# Patient Record
Sex: MALE | Race: WHITE | URBAN - METROPOLITAN AREA
[De-identification: names, ages, dates, MRNs, and addresses within clinical notes are randomized per-mention and may not be internally consistent; named-entity substitution may affect disease eponyms.]

---

## 2023-03-21 ENCOUNTER — OFFICE (OUTPATIENT)
Dept: URBAN - METROPOLITAN AREA CLINIC 94 | Facility: CLINIC | Age: 78
Setting detail: OPHTHALMOLOGY
End: 2023-03-21
Payer: MEDICARE

## 2023-03-21 DIAGNOSIS — D31.32: ICD-10-CM

## 2023-03-21 DIAGNOSIS — H43.811: ICD-10-CM

## 2023-03-21 DIAGNOSIS — H35.413: ICD-10-CM

## 2023-03-21 DIAGNOSIS — H35.3121: ICD-10-CM

## 2023-03-21 PROCEDURE — 92235 FLUORESCEIN ANGRPH MLTIFRAME: CPT | Performed by: OPHTHALMOLOGY

## 2023-03-21 PROCEDURE — 92134 CPTRZ OPH DX IMG PST SGM RTA: CPT | Performed by: OPHTHALMOLOGY

## 2023-03-21 PROCEDURE — 92014 COMPRE OPH EXAM EST PT 1/>: CPT | Performed by: OPHTHALMOLOGY

## 2023-03-21 ASSESSMENT — KERATOMETRY
OS_K1POWER_DIOPTERS: 43.25
OD_AXISANGLE_DEGREES: 11
OS_K2POWER_DIOPTERS: 43.50
OD_K1POWER_DIOPTERS: 42.75
OD_K2POWER_DIOPTERS: 44.00
OS_AXISANGLE_DEGREES: 175

## 2023-03-21 ASSESSMENT — PACHYMETRY
OD_CT_UM: 512
OD_CT_CORRECTION: 2
OS_CT_UM: 514
OS_CT_CORRECTION: 2

## 2023-03-21 ASSESSMENT — REFRACTION_AUTOREFRACTION
OS_AXIS: 90
OD_SPHERE: -0.50
OS_SPHERE: -1.50
OS_CYLINDER: -1.25
OD_AXIS: 95
OD_CYLINDER: -2.00

## 2023-03-21 ASSESSMENT — AXIALLENGTH_DERIVED
OD_AL: 24.2395
OS_AL: 24.4993

## 2023-03-21 ASSESSMENT — CONFRONTATIONAL VISUAL FIELD TEST (CVF)
OS_FINDINGS: FULL
OD_FINDINGS: FULL

## 2023-03-21 ASSESSMENT — SPHEQUIV_DERIVED
OD_SPHEQUIV: -1.5
OS_SPHEQUIV: -2.125

## 2023-03-21 ASSESSMENT — VISUAL ACUITY
OD_BCVA: 20/25-1
OS_BCVA: 20/25-1

## 2024-02-28 PROBLEM — Z00.00 ENCOUNTER FOR PREVENTIVE HEALTH EXAMINATION: Status: ACTIVE | Noted: 2024-02-28

## 2024-03-01 ENCOUNTER — APPOINTMENT (OUTPATIENT)
Dept: UROLOGY | Facility: CLINIC | Age: 79
End: 2024-03-01
Payer: MEDICARE

## 2024-03-01 VITALS
SYSTOLIC BLOOD PRESSURE: 186 MMHG | HEART RATE: 66 BPM | HEIGHT: 68 IN | DIASTOLIC BLOOD PRESSURE: 92 MMHG | WEIGHT: 155 LBS | BODY MASS INDEX: 23.49 KG/M2

## 2024-03-01 DIAGNOSIS — N20.1 CALCULUS OF URETER: ICD-10-CM

## 2024-03-01 DIAGNOSIS — Z78.9 OTHER SPECIFIED HEALTH STATUS: ICD-10-CM

## 2024-03-01 PROCEDURE — 99203 OFFICE O/P NEW LOW 30 MIN: CPT

## 2024-03-01 RX ORDER — UBIDECARENONE/VIT E ACET 100MG-5
50 MCG CAPSULE ORAL
Refills: 0 | Status: ACTIVE | COMMUNITY

## 2024-03-01 RX ORDER — TAMSULOSIN HCL 0.4 MG
0.4 CAPSULE ORAL
Refills: 0 | Status: ACTIVE | COMMUNITY

## 2024-03-01 RX ORDER — TAMARIND SEED/TURMERIC EXTRACT 250 MG
TABLET ORAL
Refills: 0 | Status: ACTIVE | COMMUNITY

## 2024-03-01 NOTE — PHYSICAL EXAM
[Well Groomed] : well groomed [Normal Appearance] : normal appearance [General Appearance - In No Acute Distress] : no acute distress [Edema] : no peripheral edema [Respiration, Rhythm And Depth] : normal respiratory rhythm and effort [Exaggerated Use Of Accessory Muscles For Inspiration] : no accessory muscle use [Abdomen Soft] : soft [Abdomen Tenderness] : non-tender [Urinary Bladder Findings] : the bladder was normal on palpation [Costovertebral Angle Tenderness] : no ~M costovertebral angle tenderness [] : no rash [Normal Station and Gait] : the gait and station were normal for the patient's age [No Focal Deficits] : no focal deficits [Oriented To Time, Place, And Person] : oriented to person, place, and time [Mood] : the mood was normal [Affect] : the affect was normal [No Palpable Adenopathy] : no palpable adenopathy

## 2024-03-01 NOTE — HISTORY OF PRESENT ILLNESS
[FreeTextEntry1] : 78 year old M seen 03/01/2024  with complaint of ureteral stone. This began when the patient presented to PCP with mild intermittent RIGHT flank pain. CT showed 12 mm RIGHT UPJ stone. Labs 2/06/2024: WBC 4.8, Cr 1.06, UA neg. PSA 2.8. Pain remains, but is mild and intermittent. He had stone discovered 10 years ago but it spontaenously resolved. He did not notice passing it.  No hematuria, no dysuria, no frequency, no urgency, no hesitancy, no straining. No incontinence.  No fevers, no chills, no nausea, no vomiting.

## 2024-03-01 NOTE — ASSESSMENT
[FreeTextEntry1] : 79 yo M with 12 mm RIGHT UPJ stone. With regards to renal calculi, we discussed several treatment options. We discussed medical expulsive therapy with alpha blocker to aid passage with pain medication and antiemetics for comfort. We also discussed the risks and benefits of ESWL. Benefits include noninvasive treatment and moderate anesthesia requirement, but risks include ineffective lithotripsy, requirement for subsequent procedures, hematoma, and steinstrasse. Finally, we discussed ureteroscopy with benefits including higher clearance rate of stones, direct visualization, concurrent stent placement, and possible stone extraction allowing for stone analysis. Risks include trauma to urethra, bladder, ureter, or kidney, hematoma, infection, ureteral stricture.   We discussed that he is unlikely to spontaneously pass a stone this large so recommended he consider intervention. Specifically ureteroscopy was recommended. Pt chooses to think about it for now.

## 2024-03-23 ENCOUNTER — OFFICE (OUTPATIENT)
Dept: URBAN - METROPOLITAN AREA CLINIC 12 | Facility: CLINIC | Age: 79
Setting detail: OPHTHALMOLOGY
End: 2024-03-23
Payer: MEDICARE

## 2024-03-23 DIAGNOSIS — H25.13: ICD-10-CM

## 2024-03-23 DIAGNOSIS — H40.013: ICD-10-CM

## 2024-03-23 DIAGNOSIS — H35.3121: ICD-10-CM

## 2024-03-23 DIAGNOSIS — H43.811: ICD-10-CM

## 2024-03-23 DIAGNOSIS — D31.32: ICD-10-CM

## 2024-03-23 DIAGNOSIS — H35.413: ICD-10-CM

## 2024-03-23 PROCEDURE — 92134 CPTRZ OPH DX IMG PST SGM RTA: CPT | Performed by: OPHTHALMOLOGY

## 2024-03-23 PROCEDURE — 92014 COMPRE OPH EXAM EST PT 1/>: CPT | Performed by: OPHTHALMOLOGY

## 2024-03-23 ASSESSMENT — REFRACTION_CURRENTRX
OD_SPHERE: -0.25
OS_OVR_VA: 20/
OS_CYLINDER: -1.25
OD_VPRISM_DIRECTION: PROGS
OS_VPRISM_DIRECTION: PROGS
OD_ADD: +2.00
OD_OVR_VA: 20/
OD_CYLINDER: -2.00
OS_SPHERE: -1.25
OS_ADD: +2.00
OD_AXIS: 085
OS_AXIS: 075

## 2024-03-23 ASSESSMENT — REFRACTION_MANIFEST
OS_AXIS: 100
OD_AXIS: 090
OS_VA1: 20/25-2
OS_SPHERE: -2.00
OS_CYLINDER: -0.50
OD_VA1: 20/25+2
OD_CYLINDER: -2.25
OD_SPHERE: PLANO

## 2024-03-23 ASSESSMENT — SPHEQUIV_DERIVED: OS_SPHEQUIV: -2.25

## 2024-04-04 ENCOUNTER — OFFICE (OUTPATIENT)
Dept: URBAN - METROPOLITAN AREA CLINIC 100 | Facility: CLINIC | Age: 79
Setting detail: OPHTHALMOLOGY
End: 2024-04-04
Payer: MEDICARE

## 2024-04-04 DIAGNOSIS — H35.413: ICD-10-CM

## 2024-04-04 DIAGNOSIS — H35.3121: ICD-10-CM

## 2024-04-04 DIAGNOSIS — D31.32: ICD-10-CM

## 2024-04-04 DIAGNOSIS — H43.811: ICD-10-CM

## 2024-04-04 DIAGNOSIS — H25.13: ICD-10-CM

## 2024-04-04 DIAGNOSIS — H40.013: ICD-10-CM

## 2024-04-04 PROBLEM — H40.1131 GLAUCOMA PRIMARY OPEN ANGLE; BOTH EYES MILD: Status: ACTIVE | Noted: 2024-04-04

## 2024-04-04 PROCEDURE — 92250 FUNDUS PHOTOGRAPHY W/I&R: CPT | Performed by: OPHTHALMOLOGY

## 2024-04-04 PROCEDURE — 92014 COMPRE OPH EXAM EST PT 1/>: CPT | Performed by: OPHTHALMOLOGY

## 2024-05-30 ENCOUNTER — RX ONLY (RX ONLY)
Age: 79
End: 2024-05-30

## 2024-05-30 ENCOUNTER — OFFICE (OUTPATIENT)
Dept: URBAN - METROPOLITAN AREA CLINIC 100 | Facility: CLINIC | Age: 79
Setting detail: OPHTHALMOLOGY
End: 2024-05-30
Payer: MEDICARE

## 2024-05-30 DIAGNOSIS — H25.13: ICD-10-CM

## 2024-05-30 DIAGNOSIS — H40.1131: ICD-10-CM

## 2024-05-30 PROCEDURE — 99214 OFFICE O/P EST MOD 30 MIN: CPT | Performed by: OPHTHALMOLOGY

## 2024-05-30 ASSESSMENT — CONFRONTATIONAL VISUAL FIELD TEST (CVF)
OD_FINDINGS: FULL
OS_FINDINGS: FULL

## 2024-08-01 ENCOUNTER — OFFICE (OUTPATIENT)
Dept: URBAN - METROPOLITAN AREA CLINIC 100 | Facility: CLINIC | Age: 79
Setting detail: OPHTHALMOLOGY
End: 2024-08-01
Payer: MEDICARE

## 2024-08-01 DIAGNOSIS — H25.13: ICD-10-CM

## 2024-08-01 DIAGNOSIS — H40.1131: ICD-10-CM

## 2024-08-01 PROCEDURE — 99214 OFFICE O/P EST MOD 30 MIN: CPT | Performed by: OPHTHALMOLOGY

## 2024-08-01 ASSESSMENT — CONFRONTATIONAL VISUAL FIELD TEST (CVF)
OD_FINDINGS: FULL
OS_FINDINGS: FULL

## 2024-12-07 ENCOUNTER — OFFICE (OUTPATIENT)
Dept: URBAN - METROPOLITAN AREA CLINIC 100 | Facility: CLINIC | Age: 79
Setting detail: OPHTHALMOLOGY
End: 2024-12-07
Payer: MEDICARE

## 2024-12-07 DIAGNOSIS — H40.1131: ICD-10-CM

## 2024-12-07 DIAGNOSIS — H25.13: ICD-10-CM

## 2024-12-07 PROCEDURE — 99214 OFFICE O/P EST MOD 30 MIN: CPT | Performed by: OPHTHALMOLOGY

## 2024-12-07 ASSESSMENT — REFRACTION_MANIFEST
OD_CYLINDER: -2.25
OD_AXIS: 095
OS_VA1: 20/NI
OS_CYLINDER: -0.50
OD_SPHERE: PLANO
OD_VA1: 20/NI
OS_SPHERE: -1.75
OS_AXIS: 080

## 2024-12-07 ASSESSMENT — REFRACTION_CURRENTRX
OS_ADD: +2.00
OD_ADD: +2.00
OS_AXIS: 073
OD_AXIS: 091
OS_SPHERE: -1.25
OD_CYLINDER: -2.00
OD_SPHERE: -0.25
OD_VPRISM_DIRECTION: PROGS
OS_VPRISM_DIRECTION: PROGS
OD_OVR_VA: 20/
OS_CYLINDER: -1.25
OS_OVR_VA: 20/

## 2024-12-07 ASSESSMENT — REFRACTION_AUTOREFRACTION
OD_AXIS: 095
OD_SPHERE: -0.25
OS_SPHERE: -1.00
OD_CYLINDER: -2.25
OS_AXIS: 086
OS_CYLINDER: -1.50

## 2024-12-07 ASSESSMENT — VISUAL ACUITY
OS_BCVA: 20/25+2
OD_BCVA: 20/20-2

## 2024-12-07 ASSESSMENT — TONOMETRY
OS_IOP_MMHG: 20
OD_IOP_MMHG: 21

## 2024-12-07 ASSESSMENT — KERATOMETRY
OD_K2POWER_DIOPTERS: 44.00
OD_K1POWER_DIOPTERS: 42.75
OS_K2POWER_DIOPTERS: 43.50
OS_K1POWER_DIOPTERS: 43.00
METHOD_AUTO_MANUAL: AUTO
OS_AXISANGLE_DEGREES: 172
OD_AXISANGLE_DEGREES: 006

## 2024-12-07 ASSESSMENT — PACHYMETRY
OD_CT_UM: 512
OS_CT_UM: 514
OD_CT_CORRECTION: 2
OS_CT_CORRECTION: 2

## 2025-03-17 ENCOUNTER — OFFICE (OUTPATIENT)
Dept: URBAN - METROPOLITAN AREA CLINIC 12 | Facility: CLINIC | Age: 80
Setting detail: OPHTHALMOLOGY
End: 2025-03-17
Payer: MEDICARE

## 2025-03-17 DIAGNOSIS — H35.3121: ICD-10-CM

## 2025-03-17 DIAGNOSIS — H25.13: ICD-10-CM

## 2025-03-17 DIAGNOSIS — H43.811: ICD-10-CM

## 2025-03-17 DIAGNOSIS — D31.32: ICD-10-CM

## 2025-03-17 DIAGNOSIS — H40.1131: ICD-10-CM

## 2025-03-17 DIAGNOSIS — H35.413: ICD-10-CM

## 2025-03-17 PROCEDURE — 92134 CPTRZ OPH DX IMG PST SGM RTA: CPT | Performed by: OPHTHALMOLOGY

## 2025-03-17 PROCEDURE — 99214 OFFICE O/P EST MOD 30 MIN: CPT | Performed by: OPHTHALMOLOGY

## 2025-03-17 ASSESSMENT — REFRACTION_CURRENTRX
OS_SPHERE: -1.25
OS_OVR_VA: 20/
OS_AXIS: 083
OD_VPRISM_DIRECTION: PROGS
OD_ADD: +2.00
OS_ADD: +3.00
OD_OVR_VA: 20/
OS_CYLINDER: -1.25
OD_AXIS: 090
OD_SPHERE: PLANO
OS_VPRISM_DIRECTION: PROGS
OD_CYLINDER: -2.25

## 2025-03-17 ASSESSMENT — KERATOMETRY
METHOD_AUTO_MANUAL: AUTO
OS_AXISANGLE_DEGREES: 083
OD_AXISANGLE_DEGREES: 009
OD_K2POWER_DIOPTERS: 44.00
OD_K1POWER_DIOPTERS: 43.00
OS_K1POWER_DIOPTERS: 43.50
OS_K2POWER_DIOPTERS: 43.75

## 2025-03-17 ASSESSMENT — VISUAL ACUITY
OS_BCVA: 20/30-2
OD_BCVA: 20/30

## 2025-03-17 ASSESSMENT — PACHYMETRY
OD_CT_CORRECTION: 2
OS_CT_UM: 514
OS_CT_CORRECTION: 2
OD_CT_UM: 512

## 2025-03-17 ASSESSMENT — REFRACTION_MANIFEST
OS_SPHERE: -1.50
OD_AXIS: 095
OS_AXIS: 090
OS_CYLINDER: -1.00
OD_CYLINDER: -2.25
OD_SPHERE: -0.25
OD_VA1: 20/25-2
OS_VA1: 20/25

## 2025-03-17 ASSESSMENT — CONFRONTATIONAL VISUAL FIELD TEST (CVF)
OS_FINDINGS: FULL
OD_FINDINGS: FULL

## 2025-03-17 ASSESSMENT — REFRACTION_AUTOREFRACTION
OD_AXIS: 094
OS_SPHERE: -1.50
OS_AXIS: 090
OD_CYLINDER: -2.25
OD_SPHERE: -0.25
OS_CYLINDER: -1.00

## 2025-03-17 ASSESSMENT — TONOMETRY
OS_IOP_MMHG: 16
OD_IOP_MMHG: 20

## 2025-04-07 ENCOUNTER — OFFICE (OUTPATIENT)
Dept: URBAN - METROPOLITAN AREA CLINIC 12 | Facility: CLINIC | Age: 80
Setting detail: OPHTHALMOLOGY
End: 2025-04-07
Payer: MEDICARE

## 2025-04-07 DIAGNOSIS — H25.11: ICD-10-CM

## 2025-04-07 DIAGNOSIS — H25.13: ICD-10-CM

## 2025-04-07 PROCEDURE — 92136 OPHTHALMIC BIOMETRY: CPT | Mod: TC | Performed by: OPHTHALMOLOGY

## 2025-04-07 PROCEDURE — 99213 OFFICE O/P EST LOW 20 MIN: CPT | Performed by: OPHTHALMOLOGY

## 2025-04-07 PROCEDURE — 92136 OPHTHALMIC BIOMETRY: CPT | Mod: 26,RT | Performed by: OPHTHALMOLOGY

## 2025-04-07 ASSESSMENT — PACHYMETRY
OS_CT_UM: 514
OD_CT_CORRECTION: 2
OS_CT_CORRECTION: 2
OD_CT_UM: 512

## 2025-04-07 ASSESSMENT — REFRACTION_CURRENTRX
OD_ADD: +2.00
OD_OVR_VA: 20/
OS_VPRISM_DIRECTION: PROGS
OD_SPHERE: PLANO
OS_CYLINDER: -1.25
OD_VPRISM_DIRECTION: PROGS
OS_SPHERE: -1.25
OS_ADD: +3.00
OD_AXIS: 090
OS_AXIS: 083
OS_OVR_VA: 20/
OD_CYLINDER: -2.25

## 2025-04-07 ASSESSMENT — VISUAL ACUITY
OS_BCVA: 20/25-2
OD_BCVA: 20/25-2

## 2025-04-07 ASSESSMENT — KERATOMETRY
OS_AXISANGLE_DEGREES: 177
OS_K2POWER_DIOPTERS: 4.50
OD_K1POWER_DIOPTERS: 42.75
OS_K1POWER_DIOPTERS: 43.25
METHOD_AUTO_MANUAL: AUTO
OD_AXISANGLE_DEGREES: 055
OD_K2POWER_DIOPTERS: 44.00

## 2025-04-07 ASSESSMENT — REFRACTION_AUTOREFRACTION
OS_SPHERE: -1.75
OS_AXIS: 084
OS_CYLINDER: -0.75
OD_AXIS: 094
OD_CYLINDER: -2.00
OD_SPHERE: -0.50

## 2025-04-07 ASSESSMENT — REFRACTION_MANIFEST
OD_AXIS: 095
OD_CYLINDER: -2.25
OS_VA1: 20/25
OS_SPHERE: -1.50
OS_AXIS: 090
OD_VA1: 20/25-2
OS_CYLINDER: -1.00
OD_SPHERE: -0.25

## 2025-04-07 ASSESSMENT — TONOMETRY
OD_IOP_MMHG: 21
OS_IOP_MMHG: 21

## 2025-04-07 ASSESSMENT — CONFRONTATIONAL VISUAL FIELD TEST (CVF)
OS_FINDINGS: FULL
OD_FINDINGS: FULL

## 2025-04-22 ENCOUNTER — ASC (OUTPATIENT)
Dept: URBAN - METROPOLITAN AREA SURGERY 8 | Facility: SURGERY | Age: 80
Setting detail: OPHTHALMOLOGY
End: 2025-04-22
Payer: MEDICARE

## 2025-04-22 DIAGNOSIS — H25.11: ICD-10-CM

## 2025-04-22 DIAGNOSIS — H52.221: ICD-10-CM

## 2025-04-22 PROCEDURE — V2788P PANOPTIX: Performed by: OPHTHALMOLOGY

## 2025-04-22 PROCEDURE — FEMTO PRECISION LASER CATARACT SURGERY: Mod: GY | Performed by: OPHTHALMOLOGY

## 2025-04-22 PROCEDURE — 66984 XCAPSL CTRC RMVL W/O ECP: CPT | Mod: RT | Performed by: OPHTHALMOLOGY

## 2025-04-22 PROCEDURE — 68841 INSJ RX ELUT IMPLT LAC CANAL: CPT | Mod: RT | Performed by: OPHTHALMOLOGY

## 2025-04-22 PROCEDURE — S9986 NOT MEDICALLY NECESSARY SVC: HCPCS | Mod: GX,GY | Performed by: OPHTHALMOLOGY

## 2025-04-23 ENCOUNTER — OFFICE (OUTPATIENT)
Dept: URBAN - METROPOLITAN AREA CLINIC 12 | Facility: CLINIC | Age: 80
Setting detail: OPHTHALMOLOGY
End: 2025-04-23
Payer: MEDICARE

## 2025-04-23 ENCOUNTER — RX ONLY (RX ONLY)
Age: 80
End: 2025-04-23

## 2025-04-23 DIAGNOSIS — H25.12: ICD-10-CM

## 2025-04-23 PROCEDURE — 92136 OPHTHALMIC BIOMETRY: CPT | Mod: 26,LT | Performed by: OPHTHALMOLOGY

## 2025-04-23 ASSESSMENT — VISUAL ACUITY
OD_BCVA: 20/70-2
OS_BCVA: 20/25-1

## 2025-04-23 ASSESSMENT — PACHYMETRY
OS_CT_CORRECTION: 2
OD_CT_UM: 512
OS_CT_UM: 514
OD_CT_CORRECTION: 2

## 2025-04-23 ASSESSMENT — REFRACTION_MANIFEST
OD_SPHERE: -0.25
OD_AXIS: 095
OS_VA1: 20/25
OS_CYLINDER: -1.00
OD_VA1: 20/25-2
OS_AXIS: 090
OD_CYLINDER: -2.25
OS_SPHERE: -1.50

## 2025-04-23 ASSESSMENT — REFRACTION_CURRENTRX
OD_AXIS: 090
OS_OVR_VA: 20/
OD_VPRISM_DIRECTION: PROGS
OS_CYLINDER: -1.25
OS_SPHERE: -1.25
OD_OVR_VA: 20/
OS_VPRISM_DIRECTION: PROGS
OD_ADD: +2.00
OD_SPHERE: PLANO
OS_AXIS: 083
OS_ADD: +3.00
OD_CYLINDER: -2.25

## 2025-04-23 ASSESSMENT — KERATOMETRY
OD_AXISANGLE_DEGREES: 009
OD_K1POWER_DIOPTERS: 43.00
OD_K2POWER_DIOPTERS: 43.75
OS_AXISANGLE_DEGREES: 177
METHOD_AUTO_MANUAL: AUTO
OS_K1POWER_DIOPTERS: 43.25
OS_K2POWER_DIOPTERS: 43.50

## 2025-04-23 ASSESSMENT — REFRACTION_AUTOREFRACTION
OS_CYLINDER: -1.00
OS_SPHERE: -1.50
OD_AXIS: 21
OS_AXIS: 94
OD_CYLINDER: -0.50
OD_SPHERE: PLANO

## 2025-04-23 ASSESSMENT — CONFRONTATIONAL VISUAL FIELD TEST (CVF)
OD_FINDINGS: FULL
OS_FINDINGS: FULL

## 2025-04-24 ENCOUNTER — OFFICE (OUTPATIENT)
Dept: URBAN - METROPOLITAN AREA CLINIC 12 | Facility: CLINIC | Age: 80
Setting detail: OPHTHALMOLOGY
End: 2025-04-24
Payer: MEDICARE

## 2025-04-24 DIAGNOSIS — Z96.1: ICD-10-CM

## 2025-04-24 PROCEDURE — 99024 POSTOP FOLLOW-UP VISIT: CPT | Performed by: OPHTHALMOLOGY

## 2025-04-24 ASSESSMENT — REFRACTION_MANIFEST
OD_AXIS: 095
OD_CYLINDER: -2.25
OS_AXIS: 090
OS_SPHERE: -1.50
OS_VA1: 20/25
OD_VA1: 20/25-2
OD_SPHERE: -0.25
OS_CYLINDER: -1.00

## 2025-04-24 ASSESSMENT — REFRACTION_CURRENTRX
OD_AXIS: 090
OS_CYLINDER: -1.25
OS_AXIS: 083
OD_OVR_VA: 20/
OD_ADD: +2.00
OD_SPHERE: PLANO
OD_CYLINDER: -2.25
OS_SPHERE: -1.25
OS_OVR_VA: 20/
OD_VPRISM_DIRECTION: PROGS
OS_VPRISM_DIRECTION: PROGS
OS_ADD: +3.00

## 2025-04-24 ASSESSMENT — REFRACTION_AUTOREFRACTION
OS_AXIS: 091
OS_SPHERE: -1.25
OS_CYLINDER: -1.25
OD_SPHERE: PLANO
OD_AXIS: 034
OD_CYLINDER: -0.25

## 2025-04-24 ASSESSMENT — PACHYMETRY
OD_CT_UM: 512
OS_CT_CORRECTION: 2
OD_CT_CORRECTION: 2
OS_CT_UM: 514

## 2025-04-24 ASSESSMENT — KERATOMETRY
OS_AXISANGLE_DEGREES: 090
OS_K2POWER_DIOPTERS: 43.50
OD_K2POWER_DIOPTERS: 43.75
OS_K1POWER_DIOPTERS: 43.50
OD_AXISANGLE_DEGREES: 003
OD_K1POWER_DIOPTERS: 42.75
METHOD_AUTO_MANUAL: AUTO

## 2025-04-24 ASSESSMENT — CONFRONTATIONAL VISUAL FIELD TEST (CVF)
OS_FINDINGS: FULL
OD_FINDINGS: FULL

## 2025-04-24 ASSESSMENT — TONOMETRY
OD_IOP_MMHG: 20
OS_IOP_MMHG: 19

## 2025-04-24 ASSESSMENT — VISUAL ACUITY
OD_BCVA: 20/60-2
OS_BCVA: 20/20

## 2025-05-02 ENCOUNTER — ASC (OUTPATIENT)
Dept: URBAN - METROPOLITAN AREA SURGERY 8 | Facility: SURGERY | Age: 80
Setting detail: OPHTHALMOLOGY
End: 2025-05-02
Payer: MEDICARE

## 2025-05-02 DIAGNOSIS — H52.222: ICD-10-CM

## 2025-05-02 DIAGNOSIS — H25.12: ICD-10-CM

## 2025-05-02 PROCEDURE — 68841 INSJ RX ELUT IMPLT LAC CANAL: CPT | Mod: 79,LT | Performed by: OPHTHALMOLOGY

## 2025-05-02 PROCEDURE — FEMTO PRECISION LASER CATARACT SURGERY: Mod: GY | Performed by: OPHTHALMOLOGY

## 2025-05-02 PROCEDURE — V2788P PANOPTIX: Performed by: OPHTHALMOLOGY

## 2025-05-02 PROCEDURE — 66984 XCAPSL CTRC RMVL W/O ECP: CPT | Mod: 79,LT | Performed by: OPHTHALMOLOGY

## 2025-05-02 PROCEDURE — S9986 NOT MEDICALLY NECESSARY SVC: HCPCS | Mod: GX,GY | Performed by: OPHTHALMOLOGY

## 2025-05-03 ENCOUNTER — OFFICE (OUTPATIENT)
Dept: URBAN - METROPOLITAN AREA CLINIC 12 | Facility: CLINIC | Age: 80
Setting detail: OPHTHALMOLOGY
End: 2025-05-03
Payer: MEDICARE

## 2025-05-03 DIAGNOSIS — Z96.1: ICD-10-CM

## 2025-05-03 PROCEDURE — 99024 POSTOP FOLLOW-UP VISIT: CPT | Performed by: OPTOMETRIST

## 2025-05-03 ASSESSMENT — REFRACTION_AUTOREFRACTION
OD_SPHERE: PLANO
OD_AXIS: 030
OS_SPHERE: +0.75
OS_CYLINDER: -1.00
OD_CYLINDER: -0.25
OS_AXIS: 024

## 2025-05-03 ASSESSMENT — REFRACTION_MANIFEST
OS_AXIS: 090
OD_SPHERE: -0.25
OS_VA1: 20/25
OD_CYLINDER: -2.25
OS_SPHERE: -1.50
OS_CYLINDER: -1.00
OD_AXIS: 095
OD_VA1: 20/25-2

## 2025-05-03 ASSESSMENT — REFRACTION_CURRENTRX
OD_OVR_VA: 20/
OS_AXIS: 083
OD_CYLINDER: -2.25
OD_ADD: +2.00
OS_SPHERE: -1.25
OD_SPHERE: PLANO
OS_ADD: +3.00
OS_CYLINDER: -1.25
OS_VPRISM_DIRECTION: PROGS
OD_VPRISM_DIRECTION: PROGS
OS_OVR_VA: 20/
OD_AXIS: 090

## 2025-05-03 ASSESSMENT — PACHYMETRY
OD_CT_CORRECTION: 2
OD_CT_UM: 512
OS_CT_CORRECTION: 2
OS_CT_UM: 514

## 2025-05-03 ASSESSMENT — KERATOMETRY
OD_K2POWER_DIOPTERS: 44.00
OS_AXISANGLE_DEGREES: 106
OS_K2POWER_DIOPTERS: 43.75
METHOD_AUTO_MANUAL: AUTO
OS_K1POWER_DIOPTERS: 42.50
OD_K1POWER_DIOPTERS: 42.75
OD_AXISANGLE_DEGREES: 001

## 2025-05-03 ASSESSMENT — VISUAL ACUITY
OS_BCVA: 20/20-1
OD_BCVA: 20/25-2

## 2025-05-03 ASSESSMENT — CONFRONTATIONAL VISUAL FIELD TEST (CVF)
OS_FINDINGS: FULL
OD_FINDINGS: FULL

## 2025-05-03 ASSESSMENT — TONOMETRY: OD_IOP_MMHG: 17

## 2025-05-04 ENCOUNTER — OFFICE (OUTPATIENT)
Dept: URBAN - METROPOLITAN AREA CLINIC 12 | Facility: CLINIC | Age: 80
Setting detail: OPHTHALMOLOGY
End: 2025-05-04
Payer: MEDICARE

## 2025-05-04 DIAGNOSIS — Z96.1: ICD-10-CM

## 2025-05-04 PROCEDURE — 99024 POSTOP FOLLOW-UP VISIT: CPT | Performed by: OPHTHALMOLOGY

## 2025-05-04 ASSESSMENT — CONFRONTATIONAL VISUAL FIELD TEST (CVF)
OS_FINDINGS: FULL
OD_FINDINGS: FULL

## 2025-05-04 ASSESSMENT — REFRACTION_MANIFEST
OD_CYLINDER: -2.25
OD_SPHERE: -0.25
OS_AXIS: 090
OD_AXIS: 095
OS_SPHERE: -1.50
OS_VA1: 20/25
OS_CYLINDER: -1.00
OD_VA1: 20/25-2

## 2025-05-04 ASSESSMENT — REFRACTION_CURRENTRX
OS_VPRISM_DIRECTION: PROGS
OD_CYLINDER: -2.25
OD_ADD: +2.00
OS_OVR_VA: 20/
OS_CYLINDER: -1.25
OD_VPRISM_DIRECTION: PROGS
OS_ADD: +3.00
OD_OVR_VA: 20/
OS_SPHERE: -1.25
OS_AXIS: 083
OD_SPHERE: PLANO
OD_AXIS: 090

## 2025-05-04 ASSESSMENT — KERATOMETRY
OD_K1POWER_DIOPTERS: 42.75
OD_AXISANGLE_DEGREES: 9
OS_K2POWER_DIOPTERS: 43.50
OD_K2POWER_DIOPTERS: 44.0
METHOD_AUTO_MANUAL: AUTO
OS_AXISANGLE_DEGREES: 134
OS_K1POWER_DIOPTERS: 42.75

## 2025-05-04 ASSESSMENT — REFRACTION_AUTOREFRACTION
OS_SPHERE: +0.75
OS_AXIS: 63
OD_SPHERE: +0.25
OD_CYLINDER: -0.25
OD_AXIS: 76
OS_CYLINDER: -1.25

## 2025-05-04 ASSESSMENT — TONOMETRY
OS_IOP_MMHG: 11
OD_IOP_MMHG: 14

## 2025-05-04 ASSESSMENT — PACHYMETRY
OS_CT_CORRECTION: 2
OD_CT_UM: 512
OS_CT_UM: 514
OD_CT_CORRECTION: 2

## 2025-05-04 ASSESSMENT — VISUAL ACUITY
OD_BCVA: 20/30-2
OS_BCVA: 20/20

## 2025-05-08 ENCOUNTER — OFFICE (OUTPATIENT)
Dept: URBAN - METROPOLITAN AREA CLINIC 12 | Facility: CLINIC | Age: 80
Setting detail: OPHTHALMOLOGY
End: 2025-05-08
Payer: MEDICARE

## 2025-05-08 DIAGNOSIS — Z96.1: ICD-10-CM

## 2025-05-08 PROCEDURE — 99024 POSTOP FOLLOW-UP VISIT: CPT | Performed by: OPHTHALMOLOGY

## 2025-05-08 ASSESSMENT — VISUAL ACUITY
OD_BCVA: 20/25-2
OS_BCVA: 20/20-1

## 2025-05-08 ASSESSMENT — KERATOMETRY
OD_K2POWER_DIOPTERS: 44.00
OS_AXISANGLE_DEGREES: 140
OS_K2POWER_DIOPTERS: 43.50
OS_K1POWER_DIOPTERS: 42.75
METHOD_AUTO_MANUAL: AUTO
OD_K1POWER_DIOPTERS: 42.50
OD_AXISANGLE_DEGREES: 006

## 2025-05-08 ASSESSMENT — REFRACTION_MANIFEST
OS_VA1: 20/25
OS_SPHERE: -1.50
OD_CYLINDER: -2.25
OS_CYLINDER: -1.00
OD_VA1: 20/25-2
OS_AXIS: 090
OD_AXIS: 095
OD_SPHERE: -0.25

## 2025-05-08 ASSESSMENT — REFRACTION_AUTOREFRACTION
OS_CYLINDER: -1.00
OD_SPHERE: +0.25
OD_CYLINDER: -0.50
OS_AXIS: 065
OD_AXIS: 069
OS_SPHERE: +0.25

## 2025-05-08 ASSESSMENT — TONOMETRY
OS_IOP_MMHG: 20
OD_IOP_MMHG: 20

## 2025-05-08 ASSESSMENT — REFRACTION_CURRENTRX
OD_OVR_VA: 20/
OS_SPHERE: -1.25
OS_VPRISM_DIRECTION: PROGS
OS_AXIS: 083
OS_CYLINDER: -1.25
OD_SPHERE: PLANO
OS_OVR_VA: 20/
OD_VPRISM_DIRECTION: PROGS
OD_CYLINDER: -2.25
OS_ADD: +3.00
OD_ADD: +2.00
OD_AXIS: 090

## 2025-05-08 ASSESSMENT — CONFRONTATIONAL VISUAL FIELD TEST (CVF)
OD_FINDINGS: FULL
OS_FINDINGS: FULL

## 2025-05-08 ASSESSMENT — PACHYMETRY
OD_CT_CORRECTION: 2
OD_CT_UM: 512
OS_CT_UM: 514
OS_CT_CORRECTION: 2

## 2025-05-29 ENCOUNTER — RX ONLY (RX ONLY)
Age: 80
End: 2025-05-29

## 2025-05-29 ENCOUNTER — OFFICE (OUTPATIENT)
Dept: URBAN - METROPOLITAN AREA CLINIC 12 | Facility: CLINIC | Age: 80
Setting detail: OPHTHALMOLOGY
End: 2025-05-29
Payer: MEDICARE

## 2025-05-29 DIAGNOSIS — Z96.1: ICD-10-CM

## 2025-05-29 PROBLEM — H26.493 POSTERIOR CAPSULAR OPACIFICATION; BOTH EYES: Status: ACTIVE | Noted: 2025-05-29

## 2025-05-29 PROCEDURE — 99024 POSTOP FOLLOW-UP VISIT: CPT | Performed by: OPHTHALMOLOGY

## 2025-05-29 ASSESSMENT — REFRACTION_CURRENTRX
OD_OVR_VA: 20/
OS_VPRISM_DIRECTION: PROGS
OS_SPHERE: -1.25
OD_SPHERE: PLANO
OS_OVR_VA: 20/
OS_AXIS: 083
OS_ADD: +3.00
OS_CYLINDER: -1.25
OD_ADD: +2.00
OD_AXIS: 090
OD_VPRISM_DIRECTION: PROGS
OD_CYLINDER: -2.25

## 2025-05-29 ASSESSMENT — REFRACTION_MANIFEST
OS_AXIS: 090
OS_VA1: 20/25
OD_AXIS: 095
OD_SPHERE: -0.25
OD_CYLINDER: -2.25
OS_SPHERE: -1.50
OD_VA1: 20/25-2
OS_CYLINDER: -1.00

## 2025-05-29 ASSESSMENT — REFRACTION_AUTOREFRACTION
OD_CYLINDER: -0.25
OD_AXIS: 082
OD_SPHERE: PLANO
OS_AXIS: 081
OS_SPHERE: PLANO
OS_CYLINDER: -0.50

## 2025-05-29 ASSESSMENT — TONOMETRY
OD_IOP_MMHG: 18
OS_IOP_MMHG: 16

## 2025-05-29 ASSESSMENT — PACHYMETRY
OD_CT_CORRECTION: 2
OS_CT_UM: 514
OD_CT_UM: 512
OS_CT_CORRECTION: 2

## 2025-05-29 ASSESSMENT — VISUAL ACUITY
OS_BCVA: 20/25
OD_BCVA: 20/30

## 2025-05-29 ASSESSMENT — KERATOMETRY
OD_K2POWER_DIOPTERS: 44.00
OS_K2POWER_DIOPTERS: 43.50
OS_K1POWER_DIOPTERS: 43.25
OS_AXISANGLE_DEGREES: 126
OD_AXISANGLE_DEGREES: 006
METHOD_AUTO_MANUAL: AUTO
OD_K1POWER_DIOPTERS: 42.75

## 2025-05-29 ASSESSMENT — CONFRONTATIONAL VISUAL FIELD TEST (CVF)
OD_FINDINGS: FULL
OS_FINDINGS: FULL

## 2025-08-28 ENCOUNTER — OFFICE (OUTPATIENT)
Dept: URBAN - METROPOLITAN AREA CLINIC 12 | Facility: CLINIC | Age: 80
Setting detail: OPHTHALMOLOGY
End: 2025-08-28
Payer: MEDICARE

## 2025-08-28 DIAGNOSIS — H40.1131: ICD-10-CM

## 2025-08-28 DIAGNOSIS — H43.811: ICD-10-CM

## 2025-08-28 DIAGNOSIS — H26.493: ICD-10-CM

## 2025-08-28 DIAGNOSIS — D31.32: ICD-10-CM

## 2025-08-28 DIAGNOSIS — H35.3121: ICD-10-CM

## 2025-08-28 DIAGNOSIS — Z96.1: ICD-10-CM

## 2025-08-28 DIAGNOSIS — H35.413: ICD-10-CM

## 2025-08-28 PROCEDURE — 92250 FUNDUS PHOTOGRAPHY W/I&R: CPT | Performed by: OPHTHALMOLOGY

## 2025-08-28 PROCEDURE — 92014 COMPRE OPH EXAM EST PT 1/>: CPT | Performed by: OPHTHALMOLOGY

## 2025-08-28 ASSESSMENT — REFRACTION_MANIFEST
OS_CYLINDER: -0.50
OS_VA1: 20/25
OD_VA1: 20/25-2
OD_AXIS: 095
OD_AXIS: 055
OD_CYLINDER: -2.25
OS_CYLINDER: -1.00
OD_SPHERE: +0.25
OS_SPHERE: -1.50
OS_AXIS: 070
OS_AXIS: 090
OD_VA1: 20/NI
OS_VA1: 20/NI
OS_SPHERE: +0.25
OD_SPHERE: -0.25
OD_CYLINDER: -0.50

## 2025-08-28 ASSESSMENT — VISUAL ACUITY
OD_BCVA: 20/30+2
OS_BCVA: 20/25-2

## 2025-08-28 ASSESSMENT — REFRACTION_CURRENTRX
OD_ADD: +2.00
OS_SPHERE: -1.25
OS_CYLINDER: -1.25
OS_ADD: +3.00
OD_CYLINDER: -2.25
OS_AXIS: 083
OS_VPRISM_DIRECTION: PROGS
OD_AXIS: 090
OD_SPHERE: PLANO
OD_OVR_VA: 20/
OD_VPRISM_DIRECTION: PROGS
OS_OVR_VA: 20/

## 2025-08-28 ASSESSMENT — PACHYMETRY
OD_CT_CORRECTION: 2
OD_CT_UM: 512
OS_CT_CORRECTION: 2
OS_CT_UM: 514

## 2025-08-28 ASSESSMENT — REFRACTION_AUTOREFRACTION
OD_AXIS: 057
OS_CYLINDER: -0.50
OS_SPHERE: +0.25
OD_CYLINDER: -0.50
OD_SPHERE: +0.25
OS_AXIS: 071

## 2025-08-28 ASSESSMENT — KERATOMETRY
OS_AXISANGLE_DEGREES: 162
OD_AXISANGLE_DEGREES: 003
OS_K2POWER_DIOPTERS: 43.50
OS_K1POWER_DIOPTERS: 43.00
OD_K2POWER_DIOPTERS: 44.00
METHOD_AUTO_MANUAL: AUTO
OD_K1POWER_DIOPTERS: 43.00

## 2025-08-28 ASSESSMENT — TONOMETRY
OD_IOP_MMHG: 15
OS_IOP_MMHG: 19

## 2025-08-28 ASSESSMENT — CONFRONTATIONAL VISUAL FIELD TEST (CVF)
OS_FINDINGS: FULL
OD_FINDINGS: FULL